# Patient Record
Sex: FEMALE | Race: WHITE | URBAN - METROPOLITAN AREA
[De-identification: names, ages, dates, MRNs, and addresses within clinical notes are randomized per-mention and may not be internally consistent; named-entity substitution may affect disease eponyms.]

---

## 2021-01-23 ENCOUNTER — IMMUNIZATIONS (OUTPATIENT)
Dept: FAMILY MEDICINE CLINIC | Facility: HOSPITAL | Age: 81
End: 2021-01-23

## 2021-01-23 DIAGNOSIS — Z23 ENCOUNTER FOR IMMUNIZATION: Primary | ICD-10-CM

## 2021-01-23 PROCEDURE — 0001A SARS-COV-2 / COVID-19 MRNA VACCINE (PFIZER-BIONTECH) 30 MCG: CPT

## 2021-01-23 PROCEDURE — 91300 SARS-COV-2 / COVID-19 MRNA VACCINE (PFIZER-BIONTECH) 30 MCG: CPT

## 2021-02-13 ENCOUNTER — IMMUNIZATIONS (OUTPATIENT)
Dept: FAMILY MEDICINE CLINIC | Facility: HOSPITAL | Age: 81
End: 2021-02-13

## 2021-02-13 DIAGNOSIS — Z23 ENCOUNTER FOR IMMUNIZATION: Primary | ICD-10-CM

## 2021-02-13 PROCEDURE — 0002A SARS-COV-2 / COVID-19 MRNA VACCINE (PFIZER-BIONTECH) 30 MCG: CPT

## 2021-02-13 PROCEDURE — 91300 SARS-COV-2 / COVID-19 MRNA VACCINE (PFIZER-BIONTECH) 30 MCG: CPT

## 2021-12-17 ENCOUNTER — IMMUNIZATIONS (OUTPATIENT)
Dept: FAMILY MEDICINE CLINIC | Facility: HOSPITAL | Age: 81
End: 2021-12-17

## 2021-12-17 DIAGNOSIS — Z23 ENCOUNTER FOR IMMUNIZATION: Primary | ICD-10-CM

## 2021-12-17 PROCEDURE — 0064A COVID-19 MODERNA VACC 0.25 ML BOOSTER: CPT

## 2021-12-17 PROCEDURE — 91306 COVID-19 MODERNA VACC 0.25 ML BOOSTER: CPT

## 2025-01-19 ENCOUNTER — HOSPITAL ENCOUNTER (EMERGENCY)
Facility: HOSPITAL | Age: 85
End: 2025-01-19
Attending: EMERGENCY MEDICINE

## 2025-01-19 DIAGNOSIS — I21.9 AMI (ACUTE MYOCARDIAL INFARCTION) (HCC): ICD-10-CM

## 2025-01-19 DIAGNOSIS — I46.9 CARDIAC ARREST (HCC): Primary | ICD-10-CM

## 2025-01-19 LAB
BASE EXCESS BLDA CALC-SCNC: -13 MMOL/L (ref -2–3)
CA-I BLD-SCNC: 0.67 MMOL/L (ref 1.12–1.32)
GLUCOSE SERPL-MCNC: 73 MG/DL (ref 65–140)
HCO3 BLDA-SCNC: 12.7 MMOL/L (ref 24–30)
HCT VFR BLD CALC: <15 % (ref 34.8–46.1)
PCO2 BLD: 14 MMOL/L (ref 21–32)
PCO2 BLD: 29.6 MM HG (ref 42–50)
PH BLD: 7.24 [PH] (ref 7.3–7.4)
PO2 BLD: 20 MM HG (ref 35–45)
POTASSIUM BLD-SCNC: 2.2 MMOL/L (ref 3.5–5.3)
SAO2 % BLD FROM PO2: 24 % (ref 60–85)
SODIUM BLD-SCNC: 151 MMOL/L (ref 136–145)
SPECIMEN SOURCE: ABNORMAL

## 2025-01-19 PROCEDURE — 92950 HEART/LUNG RESUSCITATION CPR: CPT | Performed by: SOCIAL WORKER

## 2025-01-19 PROCEDURE — 33016 PERICARDIOCENTESIS W/IMAGING: CPT | Performed by: EMERGENCY MEDICINE

## 2025-01-19 PROCEDURE — 85014 HEMATOCRIT: CPT

## 2025-01-19 PROCEDURE — 82803 BLOOD GASES ANY COMBINATION: CPT

## 2025-01-19 PROCEDURE — 99285 EMERGENCY DEPT VISIT HI MDM: CPT

## 2025-01-19 PROCEDURE — 31500 INSERT EMERGENCY AIRWAY: CPT | Performed by: SOCIAL WORKER

## 2025-01-19 PROCEDURE — 36556 INSERT NON-TUNNEL CV CATH: CPT | Performed by: EMERGENCY MEDICINE

## 2025-01-19 PROCEDURE — 84295 ASSAY OF SERUM SODIUM: CPT

## 2025-01-19 PROCEDURE — 84132 ASSAY OF SERUM POTASSIUM: CPT

## 2025-01-19 PROCEDURE — 82330 ASSAY OF CALCIUM: CPT

## 2025-01-19 PROCEDURE — 82947 ASSAY GLUCOSE BLOOD QUANT: CPT

## 2025-01-19 PROCEDURE — 31500 INSERT EMERGENCY AIRWAY: CPT | Performed by: EMERGENCY MEDICINE

## 2025-01-19 PROCEDURE — 99291 CRITICAL CARE FIRST HOUR: CPT | Performed by: EMERGENCY MEDICINE

## 2025-01-19 PROCEDURE — 92950 HEART/LUNG RESUSCITATION CPR: CPT | Performed by: EMERGENCY MEDICINE

## 2025-01-19 RX ORDER — EPINEPHRINE 0.1 MG/ML
INJECTION INTRAVENOUS CODE/TRAUMA/SEDATION MEDICATION
Status: COMPLETED | OUTPATIENT
Start: 2025-01-19 | End: 2025-01-19

## 2025-01-19 RX ORDER — CALCIUM CHLORIDE 100 MG/ML
SYRINGE (ML) INTRAVENOUS CODE/TRAUMA/SEDATION MEDICATION
Status: COMPLETED | OUTPATIENT
Start: 2025-01-19 | End: 2025-01-19

## 2025-01-19 RX ORDER — SODIUM BICARBONATE 84 MG/ML
INJECTION, SOLUTION INTRAVENOUS CODE/TRAUMA/SEDATION MEDICATION
Status: COMPLETED | OUTPATIENT
Start: 2025-01-19 | End: 2025-01-19

## 2025-01-19 RX ORDER — AMIODARONE HYDROCHLORIDE 150 MG/3ML
INJECTION, SOLUTION INTRAVENOUS CODE/TRAUMA/SEDATION MEDICATION
Status: COMPLETED | OUTPATIENT
Start: 2025-01-19 | End: 2025-01-19

## 2025-01-19 RX ORDER — DEXTROSE 50 % IN WATER 50 %
SYRINGE (ML) INTRAVENOUS CODE/TRAUMA/SEDATION MEDICATION
Status: COMPLETED | OUTPATIENT
Start: 2025-01-19 | End: 2025-01-19

## 2025-01-19 RX ADMIN — EPINEPHRINE 1 MG: 0.1 INJECTION INTRAVENOUS at 10:03

## 2025-01-19 RX ADMIN — SODIUM CHLORIDE 1000 ML: 0.9 INJECTION, SOLUTION INTRAVENOUS at 10:14

## 2025-01-19 RX ADMIN — EPINEPHRINE 1 MG: 0.1 INJECTION INTRAVENOUS at 10:06

## 2025-01-19 RX ADMIN — EPINEPHRINE 1 MG: 0.1 INJECTION INTRAVENOUS at 10:24

## 2025-01-19 RX ADMIN — SODIUM BICARBONATE 50 MEQ: 84 INJECTION PARENTERAL at 10:12

## 2025-01-19 RX ADMIN — EPINEPHRINE 1 MG: 0.1 INJECTION INTRAVENOUS at 10:28

## 2025-01-19 RX ADMIN — EPINEPHRINE 1 MG: 0.1 INJECTION INTRAVENOUS at 10:18

## 2025-01-19 RX ADMIN — DEXTROSE MONOHYDRATE 25 G: 500 INJECTION PARENTERAL at 10:21

## 2025-01-19 RX ADMIN — EPINEPHRINE 1 MG: 0.1 INJECTION INTRAVENOUS at 10:21

## 2025-01-19 RX ADMIN — SODIUM BICARBONATE 50 MEQ: 84 INJECTION, SOLUTION INTRAVENOUS at 10:18

## 2025-01-19 RX ADMIN — EPINEPHRINE 1 MG: 0.1 INJECTION INTRAVENOUS at 10:12

## 2025-01-19 RX ADMIN — EPINEPHRINE 1 MG: 0.1 INJECTION INTRAVENOUS at 10:09

## 2025-01-19 RX ADMIN — AMIODARONE HYDROCHLORIDE 300 MG: 50 INJECTION, SOLUTION INTRAVENOUS at 10:11

## 2025-01-19 RX ADMIN — CALCIUM CHLORIDE 1 G: 100 INJECTION INTRAVENOUS; INTRAVENTRICULAR at 10:16

## 2025-01-19 RX ADMIN — SODIUM BICARBONATE 50 MEQ: 84 INJECTION PARENTERAL at 10:05

## 2025-01-19 NOTE — ED PROCEDURE NOTE
Procedure  Intubation    Date/Time: 1/19/2025 11:10 AM    Performed by: Clinton Brunner MD  Authorized by: Clinton Brunner MD    Patient location:  ED  Consent:     Consent obtained:  Emergent situation  Pre-procedure details:     Patient status:  Unresponsive    Pretreatment medications:  None    Paralytics:  None  Procedure details:     CPR in progress: yes      Intubation method:  Oral    Oral intubation technique:  Direct    Laryngoscope blade:  Mac 3    Tube size (mm):  7.5    Tube type:  Cuffed    Number of attempts:  2    Ventilation between attempts: yes      Tube visualized through cords: yes    Placement assessment:     ETT to lip:  25    ETT to teeth:  24    Tube secured with:  ETT brunson    Breath sounds:  Equal    Placement verification: chest rise, colorimetric ETCO2 device and direct visualization    Post-procedure details:     Patient tolerance of procedure:  Tolerated well, no immediate complications                   Clinton Brunner MD  01/19/25 1112

## 2025-01-19 NOTE — ED PROCEDURE NOTE
Procedure  Central Line    Date/Time: 1/19/2025 10:53 AM    Performed by: Avi Brand MD  Authorized by: Avi Brand MD    Patient location:  ED  Other Assisting Provider: No    Consent:     Consent obtained:  Emergent situation  Universal protocol:     Required blood products, implants, devices, and special equipment available: yes      Site/side marked: yes      Patient identity confirmed:  Arm band  Indications:     Central line indications: medications requiring central line      Site selection rationale:  Cardiac arrest , active compressions  Anesthesia (see MAR for exact dosages):     Anesthesia method:  None  Procedure details:     Location:  Right femoral    Vessel type: vein      Laterality:  Right    Patient position:  Flat    Catheter type:  Triple lumen 16cm    Landmarks identified: yes      Ultrasound guidance: yes      Ultrasound image availability:  Not obtained due to urgency    Number of attempts:  2    Successful placement: yes    Post-procedure details:     Post-procedure:  Dressing applied and line sutured    Post-procedure complications: none      Patient tolerance of procedure:  Tolerated well, no immediate complications  General Procedure    Date/Time: 1/19/2025 10:57 AM    Performed by: Avi Brand MD  Authorized by: Avi Brand MD    Patient location:  ED  Assisting Provider(s): No    Consent:     Consent obtained:  Emergent situation  Procedure Detail:     Equipment used:  Pericardiocentesis- emergent, cardiac arrest with active compressions    Procedure note (site, laterality, method, findings):  Specialized needle with tubing. Needle inserted in the sub-xyphoid area with constant aspiration. Small amount of pericardial fluid aspirated. No air or gastric contents aspirated.                    Avi Brand MD  01/19/25 1100

## 2025-01-19 NOTE — ED ATTENDING ATTESTATION
1/19/2025  I, Andriy Johnson MD, saw and evaluated the patient. I have discussed the patient with the resident/non-physician practitioner and agree with the resident's/non-physician practitioner's findings, Plan of Care, and MDM as documented in the resident's/non-physician practitioner's note, except where noted. All available labs and Radiology studies were reviewed.  I was present for key portions of any procedure(s) performed by the resident/non-physician practitioner and I was immediately available to provide assistance.       At this point I agree with the current assessment done in the Emergency Department.  I have conducted an independent evaluation of this patient a history and physical is as follows:    ED Course    Impression: Cardiac arrest  Differential diagnosis: ACS MI arrhythmia, acidosis hyperkalemia, hypovolemia, shock, respiratory arrest    Patient seen immediately upon arrival brought back to ED room 6 from patient's private vehicle.  Initial history due to acuity of condition.    Patient is an 82-year-old female with no known past medical history per  who began to feel ill yesterday generalized malaise and fatigue.  When patient's condition worsen significant other drove patient to hospital when she became unresponsive in vehicle on ride in  Patient's  immediately notified [facility] ED personnel upon arrival who extricated patient from car and brought patient immediately to room 6 patient noted to be pulseless and apneic at that time CPR was started immediately.     Upon arrival in room 5 patient noted to be pulseless apneic patient was quickly placed on monitor noted to be in asystole PEA.  CPR resumed and ultimately transition to Ignacio device.  Patient underwent endotracheal intubation to secure airway by EM resident.  As well as central line placement.  Patient underwent PEA algorithm for ACLS receiving multiple doses of epinephrine, serum bicarbonate, calcium, IV fluids.   Patient's rhythm briefly trends addition to V-fib undergoing defibrillation and IV amiodarone for same.  Ultimately rhythm transition to a wide-complex rhythm rate 80s to 120s on monitor without pulse consistent with PEA.  Additionally, concern for possible pericardial effusion on bedside point-of-care ultrasound.  A bedside pericardiocentesis was attempted with minimal return of pericardial fluid.  Patient remained in PEA during remainder resuscitation    Despite maximal medical efforts patient remained in pulseless electrical activity decision was made to terminate any further resuscitative efforts due to futility.  Patient's  was present at bedside upon decision to terminate repeat bedside point-of-care ultrasound revealed no cardiac activity on monitor and code was subsequently called.  Time of death 10:41 AM.     Chaplaincy was physically present at bedside for patient's  and family.    Case discussed with medical examiner who declined jurisdiction of case I will certify death certificate.                     Critical Care Time  CriticalCare Time    Date/Time: 1/19/2025 1:00 PM    Performed by: Andriy Johnson MD  Authorized by: Andriy Johnson MD    Critical care provider statement:     Critical care time (minutes):  37    Critical care time was exclusive of:  Separately billable procedures and treating other patients and teaching time    Critical care was necessary to treat or prevent imminent or life-threatening deterioration of the following conditions:  Cardiac failure, circulatory failure and respiratory failure    Critical care was time spent personally by me on the following activities:  Obtaining history from patient or surrogate, evaluation of patient's response to treatment, examination of patient, ordering and performing treatments and interventions and re-evaluation of patient's condition    I assumed direction of critical care for this patient from another provider in my  specialty: no

## 2025-01-19 NOTE — PROGRESS NOTES
Pastoral Care Progress Note          Chaplaincy Interventions Utilized:      01/19/25 1200   Clinical Encounter Type   Visited With Patient and family together   Crisis Visit Critical Care;Death     Cardiac Arrest - Death - Provided spiritual and emotional support to patient and family. Also provided prayer.

## 2025-01-19 NOTE — ED PROVIDER NOTES
ED Disposition       None          Assessment & Plan       Medical Decision Making  84-year-old female presents the emergency department after being pulled from her car unresponsive.  Patient found to be in cardiac arrest.  Chest compressions began immediately.  All ACS guidelines were followed.  Central line intubation and pericardiocentesis were performed in the emergency situation.  Patient underwent nearly 30 minutes of resuscitative efforts initially with manual compressions, followed by Ignacio, including multiple rounds of epinephrine, 2 shocks for ventricular fibrillation, bicarb pushes, Levophed drip, all with no ROSC.  After all interventions were trialed, all to members agreed that further intervention was futile.  Time of death 1041.    Risk  Prescription drug management.             Medications   EPINEPHrine (ADRENALIN) injection (1 mg Intravenous Given 1/19/25 1028)   sodium bicarbonate 8.4 % injection (50 mEq Intravenous Given 1/19/25 1012)   amiodarone 150 mg/3 mL injection (300 mg Intravenous Given 1/19/25 1011)   sodium chloride 0.9 % bolus (1,000 mL Intravenous New Bag 1/19/25 1014)   calcium chloride 1 g/10 mL injection (1 g Intravenous Given 1/19/25 1016)   sodium bicarbonate 50 mEq/50 mL injection (50 mEq Intravenous Given 1/19/25 1018)   dextrose 25 g/50 mL IV solution (25 g Intravenous Given 1/19/25 1021)       ED Risk Strat Scores                                              History of Present Illness       Chief Complaint   Patient presents with    Cardiac Arrest       No past medical history on file.   No past surgical history on file.   No family history on file.       No existing history information found.   No existing history information found.   I have reviewed and agree with the history as documented.     84-year-old female presents emergency department unresponsive, pulmonary vehicle.  Unable to provide additional history.  Active cardiac arrest.  Tested efforts started  immediately.        Review of Systems   Unable to perform ROS: Patient unresponsive           Objective       ED Triage Vitals   Temp Pulse BP Resp SpO2 Patient Position - Orthostatic VS   -- -- -- -- -- --      Temp src Heart Rate Source BP Location FiO2 (%) Pain Score    -- -- -- -- --      Vitals    None         Physical Exam  Vitals reviewed.   Constitutional:       Appearance: She is ill-appearing.   HENT:      Head: Normocephalic and atraumatic.      Mouth/Throat:      Pharynx: Oropharynx is clear.      Comments: No foreign bodies visualized in the oropharynx  Cardiovascular:      Comments: Asystole with no rhythm able to be auscultated and no peripheral pulses  Pulmonary:      Comments: No breath sounds  Abdominal:      General: There is distension.   Skin:     General: Skin is warm and dry.      Findings: No bruising, erythema or rash.         Results Reviewed       Procedure Component Value Units Date/Time    POCT Blood Gas (CG8+) [[id number]]  (Abnormal) Collected: 01/19/25 1025    Lab Status: Final result Specimen: Venous Updated: 01/19/25 1059     ph, Magdi ISTAT 7.240     pCO2, Magdi i-STAT 29.6 mm HG      pO2, Magdi i-STAT 20.0 mm HG      BE, i-STAT -13 mmol/L      HCO3, Magdi i-STAT 12.7 mmol/L      CO2, i-STAT 14 mmol/L      O2 Sat, i-STAT 24 %      SODIUM, I-STAT 151 mmol/l      Potassium, i-STAT 2.2 mmol/L      Calcium, Ionized i-STAT 0.67 mmol/L      Hct, i-STAT <15 %      Hgb, i-STAT --     Glucose, i-STAT 73 mg/dl      Specimen Type VENOUS            No orders to display       Intubation    Date/Time: 1/19/2025 11:04 AM    Performed by: Avi Brand MD  Authorized by: Avi Brand MD    Patient location:  ED  Other Assisting Provider: No    Consent:     Consent obtained:  Emergent situation  Universal protocol:     Patient identity confirmed:  Arm band  Pre-procedure details:     Patient status:  Unresponsive    Pretreatment medications:  None    Paralytics:  None  Indications:     Indications for  intubation: airway protection and other (comment)      Indications for intubation comment:  Cardiac arrest  Procedure details:     Preoxygenation:  Bag valve mask    CPR in progress: yes      Intubation method:  Oral    Oral intubation technique:  Direct    Laryngoscope blade:  Mac 3    Tube size (mm):  7.5    Tube type:  Cuffed    Number of attempts:  2    Ventilation between attempts: yes      Cricoid pressure: yes      Tube visualized through cords: yes    Placement assessment:     ETT to lip:  24    ETT to teeth:  23    Tube secured with:  ETT brunson    Breath sounds:  Equal    Placement verification: chest rise, colorimetric ETCO2 device, direct visualization, equal breath sounds, ETCO2 detector, tube exhalation and capnography      CXR findings:  ETT in proper place  Post-procedure details:     Patient tolerance of procedure:  Tolerated well, no immediate complications      ED Medication and Procedure Management   None     Patient's Medications    No medications on file     No discharge procedures on file.  ED SEPSIS DOCUMENTATION            Avi Brand MD  01/19/25 1111